# Patient Record
Sex: FEMALE | Race: WHITE
[De-identification: names, ages, dates, MRNs, and addresses within clinical notes are randomized per-mention and may not be internally consistent; named-entity substitution may affect disease eponyms.]

---

## 2018-07-19 ENCOUNTER — HOSPITAL ENCOUNTER (OUTPATIENT)
Dept: HOSPITAL 8 - STAR | Age: 63
Discharge: HOME | End: 2018-07-19
Attending: SPECIALIST
Payer: MEDICARE

## 2018-07-19 DIAGNOSIS — C54.1: ICD-10-CM

## 2018-07-19 DIAGNOSIS — Z01.818: Primary | ICD-10-CM

## 2018-07-19 LAB
ALBUMIN SERPL-MCNC: 3.4 G/DL (ref 3.4–5)
ALP SERPL-CCNC: 143 U/L (ref 45–117)
ALT SERPL-CCNC: 24 U/L (ref 12–78)
ANION GAP SERPL CALC-SCNC: 7 MMOL/L (ref 5–15)
APTT BLD: 27 SECONDS (ref 25–31)
BASOPHILS # BLD AUTO: 0.05 X10^3/UL (ref 0–0.1)
BASOPHILS NFR BLD AUTO: 1 % (ref 0–1)
BILIRUB SERPL-MCNC: 0.4 MG/DL (ref 0.2–1)
CALCIUM SERPL-MCNC: 9 MG/DL (ref 8.5–10.1)
CHLORIDE SERPL-SCNC: 108 MMOL/L (ref 98–107)
CREAT SERPL-MCNC: 0.76 MG/DL (ref 0.55–1.02)
EOSINOPHIL # BLD AUTO: 0.19 X10^3/UL (ref 0–0.4)
EOSINOPHIL NFR BLD AUTO: 3 % (ref 1–7)
ERYTHROCYTE [DISTWIDTH] IN BLOOD BY AUTOMATED COUNT: 15.1 % (ref 9.6–15.2)
INR PPP: 0.95 (ref 0.93–1.1)
LYMPHOCYTES # BLD AUTO: 1.79 X10^3/UL (ref 1–3.4)
LYMPHOCYTES NFR BLD AUTO: 27 % (ref 22–44)
MCH RBC QN AUTO: 27.6 PG (ref 27–34.8)
MCHC RBC AUTO-ENTMCNC: 33.7 G/DL (ref 32.4–35.8)
MCV RBC AUTO: 81.9 FL (ref 80–100)
MD: NO
MONOCYTES # BLD AUTO: 0.74 X10^3/UL (ref 0.2–0.8)
MONOCYTES NFR BLD AUTO: 11 % (ref 2–9)
NEUTROPHILS # BLD AUTO: 3.83 X10^3/UL (ref 1.8–6.8)
NEUTROPHILS NFR BLD AUTO: 58 % (ref 42–75)
PLATELET # BLD AUTO: 332 X10^3/UL (ref 130–400)
PMV BLD AUTO: 9 FL (ref 7.4–10.4)
PROT SERPL-MCNC: 7.8 G/DL (ref 6.4–8.2)
PROTHROMBIN TIME: 9.9 SECONDS (ref 9.6–11.5)
RBC # BLD AUTO: 4.75 X10^6/UL (ref 3.82–5.3)

## 2018-07-19 PROCEDURE — 71046 X-RAY EXAM CHEST 2 VIEWS: CPT

## 2018-07-19 PROCEDURE — 85610 PROTHROMBIN TIME: CPT

## 2018-07-19 PROCEDURE — 93005 ELECTROCARDIOGRAM TRACING: CPT

## 2018-07-19 PROCEDURE — 85025 COMPLETE CBC W/AUTO DIFF WBC: CPT

## 2018-07-19 PROCEDURE — 85730 THROMBOPLASTIN TIME PARTIAL: CPT

## 2018-07-19 PROCEDURE — 80053 COMPREHEN METABOLIC PANEL: CPT

## 2018-07-19 PROCEDURE — 86304 IMMUNOASSAY TUMOR CA 125: CPT

## 2018-07-19 PROCEDURE — 36415 COLL VENOUS BLD VENIPUNCTURE: CPT

## 2018-07-23 ENCOUNTER — HOSPITAL ENCOUNTER (INPATIENT)
Dept: HOSPITAL 8 - OUT | Age: 63
LOS: 1 days | Discharge: HOME | DRG: 740 | End: 2018-07-24
Attending: SPECIALIST | Admitting: SPECIALIST
Payer: MEDICARE

## 2018-07-23 VITALS — HEIGHT: 62 IN | WEIGHT: 215.83 LBS | BODY MASS INDEX: 39.72 KG/M2

## 2018-07-23 VITALS — SYSTOLIC BLOOD PRESSURE: 106 MMHG | DIASTOLIC BLOOD PRESSURE: 46 MMHG

## 2018-07-23 DIAGNOSIS — C78.6: ICD-10-CM

## 2018-07-23 DIAGNOSIS — Z90.12: ICD-10-CM

## 2018-07-23 DIAGNOSIS — Z85.42: ICD-10-CM

## 2018-07-23 DIAGNOSIS — Z82.49: ICD-10-CM

## 2018-07-23 DIAGNOSIS — C54.1: Primary | ICD-10-CM

## 2018-07-23 DIAGNOSIS — Z85.3: ICD-10-CM

## 2018-07-23 DIAGNOSIS — Z88.1: ICD-10-CM

## 2018-07-23 DIAGNOSIS — Z98.84: ICD-10-CM

## 2018-07-23 DIAGNOSIS — Z80.9: ICD-10-CM

## 2018-07-23 DIAGNOSIS — I25.2: ICD-10-CM

## 2018-07-23 PROCEDURE — 8E0W4CZ ROBOTIC ASSISTED PROCEDURE OF TRUNK REGION, PERCUTANEOUS ENDOSCOPIC APPROACH: ICD-10-PCS | Performed by: SPECIALIST

## 2018-07-23 PROCEDURE — 86850 RBC ANTIBODY SCREEN: CPT

## 2018-07-23 PROCEDURE — 80048 BASIC METABOLIC PNL TOTAL CA: CPT

## 2018-07-23 PROCEDURE — C2617 STENT, NON-COR, TEM W/O DEL: HCPCS

## 2018-07-23 PROCEDURE — 86923 COMPATIBILITY TEST ELECTRIC: CPT

## 2018-07-23 PROCEDURE — 85025 COMPLETE CBC W/AUTO DIFF WBC: CPT

## 2018-07-23 PROCEDURE — 88307 TISSUE EXAM BY PATHOLOGIST: CPT

## 2018-07-23 PROCEDURE — 86900 BLOOD TYPING SEROLOGIC ABO: CPT

## 2018-07-23 PROCEDURE — 0UT20ZZ RESECTION OF BILATERAL OVARIES, OPEN APPROACH: ICD-10-PCS | Performed by: SPECIALIST

## 2018-07-23 PROCEDURE — 0T768DZ DILATION OF RIGHT URETER WITH INTRALUMINAL DEVICE, VIA NATURAL OR ARTIFICIAL OPENING ENDOSCOPIC: ICD-10-PCS | Performed by: SPECIALIST

## 2018-07-23 PROCEDURE — 0DBU0ZZ EXCISION OF OMENTUM, OPEN APPROACH: ICD-10-PCS | Performed by: SPECIALIST

## 2018-07-23 PROCEDURE — C1769 GUIDE WIRE: HCPCS

## 2018-07-23 PROCEDURE — 0UT70ZZ RESECTION OF BILATERAL FALLOPIAN TUBES, OPEN APPROACH: ICD-10-PCS | Performed by: SPECIALIST

## 2018-07-23 PROCEDURE — 0UT90ZL RESECTION OF UTERUS, SUPRACERVICAL, OPEN APPROACH: ICD-10-PCS | Performed by: SPECIALIST

## 2018-07-23 PROCEDURE — 36415 COLL VENOUS BLD VENIPUNCTURE: CPT

## 2018-07-23 RX ADMIN — SODIUM CHLORIDE, SODIUM LACTATE, POTASSIUM CHLORIDE, AND CALCIUM CHLORIDE SCH MLS/HR: .6; .31; .03; .02 INJECTION, SOLUTION INTRAVENOUS at 20:00

## 2018-07-23 RX ADMIN — SODIUM CHLORIDE, PRESERVATIVE FREE SCH ML: 5 INJECTION INTRAVENOUS at 21:00

## 2018-07-24 VITALS — DIASTOLIC BLOOD PRESSURE: 68 MMHG | SYSTOLIC BLOOD PRESSURE: 112 MMHG

## 2018-07-24 VITALS — SYSTOLIC BLOOD PRESSURE: 139 MMHG | DIASTOLIC BLOOD PRESSURE: 70 MMHG

## 2018-07-24 VITALS — SYSTOLIC BLOOD PRESSURE: 93 MMHG | DIASTOLIC BLOOD PRESSURE: 56 MMHG

## 2018-07-24 VITALS — SYSTOLIC BLOOD PRESSURE: 126 MMHG | DIASTOLIC BLOOD PRESSURE: 66 MMHG

## 2018-07-24 LAB
ANION GAP SERPL CALC-SCNC: 6 MMOL/L (ref 5–15)
BASOPHILS # BLD AUTO: 0.06 X10^3/UL (ref 0–0.1)
BASOPHILS NFR BLD AUTO: 1 % (ref 0–1)
CALCIUM SERPL-MCNC: 8.3 MG/DL (ref 8.5–10.1)
CHLORIDE SERPL-SCNC: 108 MMOL/L (ref 98–107)
CREAT SERPL-MCNC: 0.8 MG/DL (ref 0.55–1.02)
EOSINOPHIL # BLD AUTO: 0 X10^3/UL (ref 0–0.4)
EOSINOPHIL NFR BLD AUTO: 0 % (ref 1–7)
ERYTHROCYTE [DISTWIDTH] IN BLOOD BY AUTOMATED COUNT: 15 % (ref 9.6–15.2)
LYMPHOCYTES # BLD AUTO: 0.85 X10^3/UL (ref 1–3.4)
LYMPHOCYTES NFR BLD AUTO: 8 % (ref 22–44)
MCH RBC QN AUTO: 27.5 PG (ref 27–34.8)
MCHC RBC AUTO-ENTMCNC: 33.1 G/DL (ref 32.4–35.8)
MCV RBC AUTO: 83 FL (ref 80–100)
MD: NO
MONOCYTES # BLD AUTO: 0.63 X10^3/UL (ref 0.2–0.8)
MONOCYTES NFR BLD AUTO: 6 % (ref 2–9)
NEUTROPHILS # BLD AUTO: 9.12 X10^3/UL (ref 1.8–6.8)
NEUTROPHILS NFR BLD AUTO: 86 % (ref 42–75)
PLATELET # BLD AUTO: 299 X10^3/UL (ref 130–400)
PMV BLD AUTO: 9.4 FL (ref 7.4–10.4)
RBC # BLD AUTO: 4.42 X10^6/UL (ref 3.82–5.3)

## 2018-07-24 RX ADMIN — SODIUM CHLORIDE, PRESERVATIVE FREE SCH ML: 5 INJECTION INTRAVENOUS at 08:50

## 2018-07-24 RX ADMIN — SODIUM CHLORIDE, SODIUM LACTATE, POTASSIUM CHLORIDE, AND CALCIUM CHLORIDE SCH MLS/HR: .6; .31; .03; .02 INJECTION, SOLUTION INTRAVENOUS at 06:00

## 2018-09-17 ENCOUNTER — HOSPITAL ENCOUNTER (OUTPATIENT)
Dept: HOSPITAL 8 - OUT | Age: 63
Discharge: HOME | End: 2018-09-17
Attending: SPECIALIST
Payer: MEDICARE

## 2018-09-17 VITALS — BODY MASS INDEX: 36.03 KG/M2 | HEIGHT: 62 IN | WEIGHT: 195.77 LBS

## 2018-09-17 VITALS — SYSTOLIC BLOOD PRESSURE: 132 MMHG | DIASTOLIC BLOOD PRESSURE: 85 MMHG

## 2018-09-17 DIAGNOSIS — Z45.2: Primary | ICD-10-CM

## 2018-09-17 DIAGNOSIS — C54.1: ICD-10-CM

## 2018-09-17 PROCEDURE — 71045 X-RAY EXAM CHEST 1 VIEW: CPT

## 2018-09-17 PROCEDURE — 77001 FLUOROGUIDE FOR VEIN DEVICE: CPT

## 2018-09-17 PROCEDURE — C1788 PORT, INDWELLING, IMP: HCPCS

## 2018-09-17 PROCEDURE — 86850 RBC ANTIBODY SCREEN: CPT

## 2018-09-17 PROCEDURE — 36415 COLL VENOUS BLD VENIPUNCTURE: CPT

## 2018-09-17 PROCEDURE — 86900 BLOOD TYPING SEROLOGIC ABO: CPT

## 2018-11-21 ENCOUNTER — HOSPITAL ENCOUNTER (OUTPATIENT)
Dept: HOSPITAL 8 - LAB | Age: 63
Discharge: HOME | End: 2018-11-21
Attending: SPECIALIST
Payer: COMMERCIAL

## 2018-11-21 DIAGNOSIS — R30.0: Primary | ICD-10-CM

## 2018-11-21 LAB
ALBUMIN SERPL-MCNC: 3.1 G/DL (ref 3.4–5)
ALP SERPL-CCNC: 123 U/L (ref 45–117)
ALT SERPL-CCNC: 20 U/L (ref 12–78)
ANION GAP SERPL CALC-SCNC: 9 MMOL/L (ref 5–15)
BASOPHILS # BLD AUTO: 0.01 X10^3/UL (ref 0–0.1)
BASOPHILS NFR BLD AUTO: 0 % (ref 0–1)
BILIRUB SERPL-MCNC: 0.5 MG/DL (ref 0.2–1)
CALCIUM SERPL-MCNC: 8.8 MG/DL (ref 8.5–10.1)
CHLORIDE SERPL-SCNC: 111 MMOL/L (ref 98–107)
CREAT SERPL-MCNC: 0.72 MG/DL (ref 0.55–1.02)
EOSINOPHIL # BLD AUTO: 0.01 X10^3/UL (ref 0–0.4)
EOSINOPHIL NFR BLD AUTO: 0 % (ref 1–7)
ERYTHROCYTE [DISTWIDTH] IN BLOOD BY AUTOMATED COUNT: 17 % (ref 9.6–15.2)
LYMPHOCYTES # BLD AUTO: 1.84 X10^3/UL (ref 1–3.4)
LYMPHOCYTES NFR BLD AUTO: 52 % (ref 22–44)
MCH RBC QN AUTO: 31.6 PG (ref 27–34.8)
MCHC RBC AUTO-ENTMCNC: 34.7 G/DL (ref 32.4–35.8)
MCV RBC AUTO: 91.1 FL (ref 80–100)
MD: (no result)
MICROSCOPIC: (no result)
MONOCYTES # BLD AUTO: 0.37 X10^3/UL (ref 0.2–0.8)
MONOCYTES NFR BLD AUTO: 11 % (ref 2–9)
NEUTROPHILS # BLD AUTO: 1.29 X10^3/UL (ref 1.8–6.8)
NEUTROPHILS NFR BLD AUTO: 37 % (ref 42–75)
PLATELET # BLD AUTO: 34 X10^3/UL (ref 130–400)
PMV BLD AUTO: 9 FL (ref 7.4–10.4)
PROT SERPL-MCNC: 7.1 G/DL (ref 6.4–8.2)
RBC # BLD AUTO: 3.2 X10^6/UL (ref 3.82–5.3)

## 2018-11-21 PROCEDURE — 81001 URINALYSIS AUTO W/SCOPE: CPT

## 2018-11-21 PROCEDURE — 87086 URINE CULTURE/COLONY COUNT: CPT

## 2018-11-21 PROCEDURE — 85025 COMPLETE CBC W/AUTO DIFF WBC: CPT

## 2018-11-21 PROCEDURE — 80053 COMPREHEN METABOLIC PANEL: CPT

## 2018-11-21 PROCEDURE — 36415 COLL VENOUS BLD VENIPUNCTURE: CPT

## 2018-11-21 PROCEDURE — 86304 IMMUNOASSAY TUMOR CA 125: CPT

## 2018-12-18 ENCOUNTER — HOSPITAL ENCOUNTER (OUTPATIENT)
Dept: HOSPITAL 8 - CFH | Age: 63
Discharge: HOME | End: 2018-12-18
Attending: SPECIALIST
Payer: COMMERCIAL

## 2018-12-18 DIAGNOSIS — C54.1: ICD-10-CM

## 2018-12-18 DIAGNOSIS — Z98.84: ICD-10-CM

## 2018-12-18 DIAGNOSIS — K76.0: Primary | ICD-10-CM

## 2018-12-18 DIAGNOSIS — Z92.21: ICD-10-CM

## 2018-12-18 PROCEDURE — 71260 CT THORAX DX C+: CPT

## 2018-12-18 PROCEDURE — 74177 CT ABD & PELVIS W/CONTRAST: CPT

## 2019-03-20 ENCOUNTER — HOSPITAL ENCOUNTER (OUTPATIENT)
Dept: HOSPITAL 8 - RAD | Age: 64
Discharge: HOME | End: 2019-03-20
Attending: SPECIALIST
Payer: COMMERCIAL

## 2019-03-20 DIAGNOSIS — K76.9: ICD-10-CM

## 2019-03-20 DIAGNOSIS — C54.1: Primary | ICD-10-CM

## 2019-03-20 PROCEDURE — 74177 CT ABD & PELVIS W/CONTRAST: CPT

## 2019-03-20 PROCEDURE — 71260 CT THORAX DX C+: CPT

## 2019-11-14 ENCOUNTER — HOSPITAL ENCOUNTER (OUTPATIENT)
Dept: HOSPITAL 8 - INFUSION | Age: 64
Discharge: HOME | End: 2019-11-14
Attending: SPECIALIST
Payer: COMMERCIAL

## 2019-11-14 VITALS — HEIGHT: 62 IN | WEIGHT: 203.71 LBS | BODY MASS INDEX: 37.49 KG/M2

## 2019-11-14 VITALS — DIASTOLIC BLOOD PRESSURE: 79 MMHG | SYSTOLIC BLOOD PRESSURE: 154 MMHG

## 2019-11-14 DIAGNOSIS — C54.1: Primary | ICD-10-CM

## 2019-11-14 DIAGNOSIS — Z88.1: ICD-10-CM

## 2019-11-14 DIAGNOSIS — Z85.3: ICD-10-CM

## 2019-11-14 PROCEDURE — 96413 CHEMO IV INFUSION 1 HR: CPT

## 2019-12-03 ENCOUNTER — HOSPITAL ENCOUNTER (EMERGENCY)
Dept: HOSPITAL 8 - ED | Age: 64
Discharge: HOME | End: 2019-12-03

## 2019-12-03 VITALS — DIASTOLIC BLOOD PRESSURE: 80 MMHG | SYSTOLIC BLOOD PRESSURE: 160 MMHG

## 2019-12-03 VITALS — BODY MASS INDEX: 37.53 KG/M2 | WEIGHT: 203.93 LBS | HEIGHT: 62 IN

## 2019-12-03 DIAGNOSIS — R20.2: ICD-10-CM

## 2019-12-03 DIAGNOSIS — G43.B0: Primary | ICD-10-CM

## 2019-12-03 PROCEDURE — 70496 CT ANGIOGRAPHY HEAD: CPT

## 2019-12-03 PROCEDURE — 70551 MRI BRAIN STEM W/O DYE: CPT

## 2019-12-03 PROCEDURE — 82962 GLUCOSE BLOOD TEST: CPT

## 2019-12-03 PROCEDURE — 93005 ELECTROCARDIOGRAM TRACING: CPT

## 2019-12-03 PROCEDURE — 99284 EMERGENCY DEPT VISIT MOD MDM: CPT

## 2019-12-03 PROCEDURE — 70498 CT ANGIOGRAPHY NECK: CPT

## 2019-12-03 NOTE — NUR
PT BACK FROM CT. PT REFUSING BP CUFF AT THIS TIME. ALL OTHER VSS ON RA. PT 
MEDICATED PER MAR. NO NEEDS EXPRESSED. CALL LIGHT WITHIN REACH. AWAITING 
RESUTLS.

## 2019-12-03 NOTE — NUR
PT CONTINUING TO REFUSE BP CUFF. ALL OTHER VSS ON RA. NO NEEDS EXPRESSED. CALL 
LIGHT WITHIN REACH. AWAITING RESUTLS.

## 2019-12-03 NOTE — NUR
TASK RN:

 2.5 INCH 18 GAUGE PIV PLACED TO RIGHT FOREARM WITH ULTRASOUND

   (UNABLE TO USE PORT AS PATIENT UNSURE IF POWERPORT AND NO CURRENT IMAGING 
AVAILABLE TO CONFIRM OTHERWISE)

## 2019-12-03 NOTE — NUR
pt to ed for right sided facial and arm numbness. pt was seen at [facility] 
yesterday for same. pt states s/s resolved and came back today. pt reports rle 
weakness that may be related to chemo. pt denies numbness at this time. pt 
connected to all monitors. vss. attempt for piv x1 by this rn. will attempt 
with us.

## 2019-12-03 NOTE — NUR
ASSUMED CARE FOR DISCHARGE ONLY  Patient/Caregiver given discharge instructions 
and they have confirmed that they understand the instructions.  Patient 
ambulatory with steady gait.

## 2020-06-15 ENCOUNTER — HOSPITAL ENCOUNTER (OUTPATIENT)
Dept: HOSPITAL 8 - RAD | Age: 65
Discharge: HOME | End: 2020-06-15
Attending: SPECIALIST
Payer: COMMERCIAL

## 2020-06-15 DIAGNOSIS — R18.8: Primary | ICD-10-CM

## 2020-06-15 DIAGNOSIS — G89.3: ICD-10-CM

## 2020-06-15 PROCEDURE — 82042 OTHER SOURCE ALBUMIN QUAN EA: CPT

## 2020-06-15 PROCEDURE — 84157 ASSAY OF PROTEIN OTHER: CPT

## 2020-06-15 PROCEDURE — 49083 ABD PARACENTESIS W/IMAGING: CPT

## 2020-06-15 PROCEDURE — 89051 BODY FLUID CELL COUNT: CPT

## 2020-06-15 PROCEDURE — 87205 SMEAR GRAM STAIN: CPT

## 2020-06-15 PROCEDURE — 87070 CULTURE OTHR SPECIMN AEROBIC: CPT

## 2020-06-15 PROCEDURE — 87075 CULTR BACTERIA EXCEPT BLOOD: CPT

## 2020-07-09 ENCOUNTER — HOSPITAL ENCOUNTER (OUTPATIENT)
Dept: HOSPITAL 8 - RAD | Age: 65
Discharge: HOME | End: 2020-07-09
Attending: SPECIALIST
Payer: COMMERCIAL

## 2020-07-09 DIAGNOSIS — C54.1: Primary | ICD-10-CM

## 2020-07-09 PROCEDURE — 49083 ABD PARACENTESIS W/IMAGING: CPT
